# Patient Record
Sex: MALE | Race: WHITE | NOT HISPANIC OR LATINO
[De-identification: names, ages, dates, MRNs, and addresses within clinical notes are randomized per-mention and may not be internally consistent; named-entity substitution may affect disease eponyms.]

---
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
## 2020-09-14 ENCOUNTER — APPOINTMENT (OUTPATIENT)
Dept: GASTROENTEROLOGY | Facility: CLINIC | Age: 20
End: 2020-09-14
Payer: COMMERCIAL

## 2020-09-14 VITALS
DIASTOLIC BLOOD PRESSURE: 60 MMHG | BODY MASS INDEX: 31.5 KG/M2 | HEIGHT: 70 IN | SYSTOLIC BLOOD PRESSURE: 100 MMHG | TEMPERATURE: 97.6 F | WEIGHT: 220 LBS

## 2020-09-14 VITALS — TEMPERATURE: 97.5 F

## 2020-09-14 DIAGNOSIS — R94.5 ABNORMAL RESULTS OF LIVER FUNCTION STUDIES: ICD-10-CM

## 2020-09-14 PROCEDURE — 99214 OFFICE O/P EST MOD 30 MIN: CPT | Mod: 25

## 2020-09-14 PROCEDURE — 36415 COLL VENOUS BLD VENIPUNCTURE: CPT

## 2020-09-14 NOTE — PHYSICAL EXAM
[General Appearance - Alert] : alert [General Appearance - In No Acute Distress] : in no acute distress [General Appearance - Well Nourished] : well nourished [General Appearance - Well Developed] : well developed [FreeTextEntry1] : obese [Sclera] : the sclera and conjunctiva were normal [Hearing Threshold Finger Rub Not [place]] : hearing was normal [Heart Sounds] : normal S1 and S2 [Heart Rate And Rhythm] : heart rate was normal and rhythm regular [No CVA Tenderness] : no ~M costovertebral angle tenderness [Abnormal Walk] : normal gait [] : no rash [Skin Lesions] : no skin lesions [No Focal Deficits] : no focal deficits [Oriented To Time, Place, And Person] : oriented to person, place, and time

## 2020-09-14 NOTE — ASSESSMENT
[FreeTextEntry1] : chronic elevated lfts r/o fatty liver noted for one year, h/o increase cholesterol\par \par plan abdominal us\par         labs ordered\par        diet and exercise for weight loss.

## 2020-09-14 NOTE — REVIEW OF SYSTEMS
[As Noted in HPI] : as noted in HPI [Fever] : no fever [Chills] : no chills [Eyesight Problems] : no eyesight problems [Earache] : no earache [Loss Of Hearing] : no hearing loss [Chest Pain] : no chest pain [Palpitations] : no palpitations [Cough] : no cough [SOB on Exertion] : no shortness of breath during exertion [Hesitancy] : no urinary hesitancy [Joint Swelling] : no joint swelling [Anxiety] : no anxiety [Muscle Weakness] : no muscle weakness [Easy Bleeding] : no tendency for easy bleeding [Easy Bruising] : no tendency for easy bruising

## 2020-09-14 NOTE — HISTORY OF PRESENT ILLNESS
[FreeTextEntry1] : 19 yo male here for  follow up of abnormal lfts, alt elevated to 65  for over one year.  patient gained weight over quarantine. Patient saw nutritionist. no abdominal pain.normal bms, no brbpr, no melena..patient reports lost 5lbs.

## 2020-09-21 LAB
A1AT SERPL-MCNC: 116 MG/DL
ALBUMIN MFR SERPL ELPH: 62.1 %
ALBUMIN SERPL-MCNC: 4.8 G/DL
ALBUMIN/GLOB SERPL: 1.7 RATIO
ALPHA1 GLOB MFR SERPL ELPH: 3.2 %
ALPHA1 GLOB SERPL ELPH-MCNC: 0.2 G/DL
ALPHA2 GLOB MFR SERPL ELPH: 6.7 %
ALPHA2 GLOB SERPL ELPH-MCNC: 0.5 G/DL
ANA SER IF-ACNC: NEGATIVE
B-GLOBULIN MFR SERPL ELPH: 10.1 %
B-GLOBULIN SERPL ELPH-MCNC: 0.8 G/DL
CERULOPLASMIN SERPL-MCNC: 18 MG/DL
FERRITIN SERPL-MCNC: 331 NG/ML
GAMMA GLOB FLD ELPH-MCNC: 1.4 G/DL
GAMMA GLOB MFR SERPL ELPH: 17.9 %
HAV IGM SER QL: NONREACTIVE
HBV CORE IGM SER QL: NONREACTIVE
HBV SURFACE AB SER QL: NONREACTIVE
HBV SURFACE AG SER QL: NONREACTIVE
HCV AB SER QL: NONREACTIVE
HCV S/CO RATIO: 0.07 S/CO
HEPATITIS A IGG ANTIBODY: REACTIVE
INTERPRETATION SERPL IEP-IMP: NORMAL
IRON SATN MFR SERPL: 41 %
IRON SERPL-MCNC: 137 UG/DL
MITOCHONDRIA AB SER IF-ACNC: NORMAL
PROT SERPL-MCNC: 7.7 G/DL
PROT SERPL-MCNC: 7.7 G/DL
SMOOTH MUSCLE AB SER QL IF: ABNORMAL
TIBC SERPL-MCNC: 332 UG/DL
UIBC SERPL-MCNC: 196 UG/DL

## 2020-09-25 ENCOUNTER — OUTPATIENT (OUTPATIENT)
Dept: OUTPATIENT SERVICES | Facility: HOSPITAL | Age: 20
LOS: 1 days | End: 2020-09-25
Payer: COMMERCIAL

## 2020-09-25 ENCOUNTER — APPOINTMENT (OUTPATIENT)
Dept: ULTRASOUND IMAGING | Facility: CLINIC | Age: 20
End: 2020-09-25
Payer: COMMERCIAL

## 2020-09-25 DIAGNOSIS — Z00.8 ENCOUNTER FOR OTHER GENERAL EXAMINATION: ICD-10-CM

## 2020-09-25 PROCEDURE — 76700 US EXAM ABDOM COMPLETE: CPT | Mod: 26

## 2020-09-25 PROCEDURE — 76700 US EXAM ABDOM COMPLETE: CPT

## 2020-12-15 PROBLEM — J02.0 STREP THROAT: Status: RESOLVED | Noted: 2017-11-07 | Resolved: 2020-12-15

## 2020-12-16 PROBLEM — Z87.09 HISTORY OF ACUTE PHARYNGITIS: Status: RESOLVED | Noted: 2018-10-06 | Resolved: 2020-12-16

## 2021-03-10 ENCOUNTER — NON-APPOINTMENT (OUTPATIENT)
Age: 21
End: 2021-03-10

## 2021-03-10 ENCOUNTER — APPOINTMENT (OUTPATIENT)
Dept: ORTHOPEDIC SURGERY | Facility: CLINIC | Age: 21
End: 2021-03-10
Payer: COMMERCIAL

## 2021-03-10 VITALS — HEIGHT: 69 IN | BODY MASS INDEX: 27.7 KG/M2 | WEIGHT: 187 LBS

## 2021-03-10 DIAGNOSIS — M75.82 OTHER SHOULDER LESIONS, LEFT SHOULDER: ICD-10-CM

## 2021-03-10 PROCEDURE — 99203 OFFICE O/P NEW LOW 30 MIN: CPT

## 2021-03-10 PROCEDURE — 99072 ADDL SUPL MATRL&STAF TM PHE: CPT

## 2021-03-15 NOTE — HISTORY OF PRESENT ILLNESS
[de-identified] : PATIENT COMPLAINS OF LEFT SHOULDER\par PAIN BEGAN 3/3/2021\par PAIN    6/10 ,  RADIATES  UP NECK\par ASSOCIATED NUMBNESS\par CONSTANT \par SHARP, SHOOTING, THROBBING PAIN \par WORSE WITH OVERHEAD LIFTING, LAYING IN BED \par BETTER WITH TYLENOL, RESTING\par  \par \par \par \par

## 2021-03-15 NOTE — DISCUSSION/SUMMARY
[de-identified] : DICLOFENAC 2 X DAY 2-3 WEEKS\par HOME EXERCISES DAILY\par PT 2 X 4 WEEKS \par \par CONSIDER KENALOG INJECTION \par \par MRI IF NO RELIEF AFTER 12 WEEK OF TREATMENT\par

## 2021-03-15 NOTE — PHYSICAL EXAM
[de-identified] : PHYSICAL EXAM LEFT  SHOULDER\par \par MODERATE  SCAPULAR PROTRACTION\par AROM 140 / 140 / 90 / 30\par TENDER: SA REGION LATERAL \par \par SPECIAL TESTING :\par ROSE - POSITIVE \par LONA - POSITIVE \par SPEED TEST - POSITIVE\par \par VILLALBOOS - NEGATIVE \par APPREHENSION AND SUPPRESSION - NEGATIVE \par \par RC STRENGTH TESTING \par SS:  5/5\par SUB 5/5\par IS     5/5\par BICEPS  5/5\par \par SENSATION  - GROSSLY INTACT\par \par \par  [de-identified] : LEFT SHOULDER XRAY (2 VIEWS - AP AND OUTLET) -  \par NO OBVIOUS FRACTURE , SEPARATION OR DISLOCATION \par NO SIGNIFICANT OSTEOARTHRITIS, \par TYPE 1B ACROMION \par CSA= 40.4\par

## 2021-08-23 ENCOUNTER — TRANSCRIPTION ENCOUNTER (OUTPATIENT)
Age: 21
End: 2021-08-23

## 2022-03-24 ENCOUNTER — APPOINTMENT (OUTPATIENT)
Dept: ORTHOPEDIC SURGERY | Facility: CLINIC | Age: 22
End: 2022-03-24
Payer: COMMERCIAL

## 2022-03-24 DIAGNOSIS — M75.81 OTHER SHOULDER LESIONS, RIGHT SHOULDER: ICD-10-CM

## 2022-03-24 DIAGNOSIS — M75.42 IMPINGEMENT SYNDROME OF LEFT SHOULDER: ICD-10-CM

## 2022-03-24 DIAGNOSIS — M25.512 PAIN IN LEFT SHOULDER: ICD-10-CM

## 2022-03-24 PROCEDURE — 73030 X-RAY EXAM OF SHOULDER: CPT | Mod: RT

## 2022-03-24 PROCEDURE — 20611 DRAIN/INJ JOINT/BURSA W/US: CPT | Mod: 59,LT

## 2022-03-24 PROCEDURE — 99214 OFFICE O/P EST MOD 30 MIN: CPT | Mod: 25

## 2022-03-24 PROCEDURE — 76882 US LMTD JT/FCL EVL NVASC XTR: CPT | Mod: LT

## 2022-03-24 RX ORDER — DICLOFENAC SODIUM 75 MG/1
75 TABLET, DELAYED RELEASE ORAL TWICE DAILY
Qty: 60 | Refills: 2 | Status: ACTIVE | COMMUNITY
Start: 2021-03-10 | End: 1900-01-01

## 2022-03-24 NOTE — DISCUSSION/SUMMARY
[de-identified] : ULTRASOUND EVALUATION  REVEALS INFLAMMATORY CHANGES WITHOUT SIGNIFICANT TEAR \par PATIENT HAS ELECTED TO PROCEED WITH KENALOG INJECTION SHOULDER \par RISKS AND BENEFITS DISCUSSED - VERBAL CONSENT OBTAINED \par SEE PROCEDURE NOTE\par \par \par POST INJECTION INSTRUCTIONS:\par \par INJECTION THERAPY HANDOUT PROVIDED\par \par COLD THERAPY , ANALGESICS PRN\par \par HOME  EXERCISES QD - PENDULUM AND ROM  HANDOUT PROVIDED, REVIEWED AND DEMONSTRATED - REFERRED TO INSTRUCTIONAL VIDEO ON MY WEBSITE\par \par START P.T.  WITHIN 2 WEEKS AFTER INJECTION - 2 X 4 WEEKS \par \par MRI IF NO RELIEF 12 WEEKS\par

## 2022-03-24 NOTE — PROCEDURE
[de-identified] : DIAGNOSTIC ULTRASOUND LEFT SHOULDER\par \par DIAGNOSTIC SONOGRAPHY of the Rotator Cuff Soft Tissue of the LEFT  SHOULDER was performed in Multiple Scan Planes with varying transducer frequencies.\par Imaging of the Supraspinatus Tendon reveals TENDONITIS, BURSITIS,  WITH OUT SIGNIFICANT / COMPLETE TEAR\par Imaging of the Biceps Tendon reveals no significant tear.\par Imaging of the Subscapularis Tendon reveals no significant tear.\par Imaging of the Infraspinatus Tendon reveals no significant tear.\par Key images were save digitally and reviewed with patient.\par \par \par INJECTION LEFT SHOULDER SA SPACE\par \par Patient has demonstrated limited relief from NSAIDS, rest, exercises / PT, and after discussion of the risks and benefits, the patient has elected to proceed with an ULTRASOUND GUIDED injection into the LEFT SUBACROMIAL  SPACE LATERAL APPROACH \par  \par Confirmed that the patient does not have history of prior adverse reactions, active, infections, or relevant allergies. There was no effusion, erythema, or warmth, and the skin was clear\par \par The skin was sterilized with alcohol. Ethyl Chloride was used as a topical anesthetic. Routine sterile technique. \par The site was injected UTILIZING ULTRASOUND GUIDANCE to confirm appropriate placement of the needle-\par with a mixture of medication and local anesthetic. The injection was completed without complication and a bandage was applied.\par  \par The patient tolerated the procedure well and was given post-injection instructions.Rec: Cold therapy, analgesics, avoid heavy activity.\par MEDICATION: 4cc of 1% xylocaine + 10mg of KENALOG\par \par

## 2022-03-24 NOTE — PHYSICAL EXAM
[de-identified] : PHYSICAL EXAM LEFT AND RIGHT   SHOULDER - RHD\par \par MILD PROTRACTION\par AROM \par LEFT  140 / 140 / 95 / 15\par RIGHT   140 / 140 / 95 / 30\par TENDER: SA REGION LATERAL \par \par SPECIAL TESTING :\par ROSE - POSITIVE \par LONA - POSITIVE \par SPEED TEST - POSITIVE\par \par VILLALOBOS - NEGATIVE \par APPREHENSION AND SUPPRESSION - NEGATIVE \par \par RC STRENGTH TESTING \par SS:  5/5\par SUB 5/5\par IS     5/5\par BICEPS  5/5\par \par SENSATION  - GROSSLY INTACT\par \par \par  [de-identified] : RIGHT SHOULDER XRAY (2 VIEWS - AP AND OUTLET) -  \par NO OBVIOUS FRACTURE ,  SEPARATION OR DISLOCATION \par NO SIGNIFICANT OSTEOARTHRITIS,\par TYPE 2B ACROMION \par CSA=33.8\par

## 2022-03-24 NOTE — HISTORY OF PRESENT ILLNESS
[de-identified] : PATIENT COMPLAINS OF BILATERAL SHOULDER PAIN  - RHD\par LEFT WORSE THAN RIGHT \par LEFT SHOULDER-CONSTANT PAIN \par PAIN FLARED UP  MARCH 2022 \par PAIN    6/10 \par MARCH 2021- PATIENT TRIED DICLOFENAC PILL AND AT HOME EXERCISES-  RELIEF\par CONSTANT \par SHARP, SHOOTING, THROBBING PAIN \par WORSE WITH OVERHEAD LIFTING, LAYING IN BED \par BETTER WITH TYLENOL, RESTING\par  \par \par \par \par

## 2022-03-25 PROBLEM — M75.42 IMPINGEMENT SYNDROME OF LEFT SHOULDER: Status: ACTIVE | Noted: 2021-03-10

## 2022-03-25 PROBLEM — M25.512 LEFT SHOULDER PAIN: Status: ACTIVE | Noted: 2021-03-10

## 2022-07-06 ENCOUNTER — NON-APPOINTMENT (OUTPATIENT)
Age: 22
End: 2022-07-06

## 2024-08-21 NOTE — REASON FOR VISIT
[Parent] : parent [FreeTextEntry1] : left sided abdominal pain BMI: BMI (kg/m2): 33.2 (07-24-24 @ 18:09)  HbA1c:   Glucose:   BP: --Vital Signs Last 24 Hrs  T(C): 36.4 (08-21-24 @ 08:06), Max: 36.4 (08-20-24 @ 21:02)  T(F): 97.6 (08-21-24 @ 08:06), Max: 97.6 (08-21-24 @ 08:06)  HR: --  BP: --  BP(mean): --  RR: 16 (08-21-24 @ 08:06) (16 - 16)  SpO2: --    Orthostatic VS  08-21-24 @ 08:06  Lying BP: --/-- HR: --  Sitting BP: 106/68 HR: 98  Standing BP: 106/57 HR: 114  Site: --  Mode: --  Orthostatic VS  08-20-24 @ 08:16  Lying BP: --/-- HR: --  Sitting BP: 108/75 HR: 80  Standing BP: 113/80 HR: 100  Site: --  Mode: --    Lipid Panel:  BMI: BMI (kg/m2): 33.2 (07-24-24 @ 18:09)  HbA1c:   Glucose:   BP: --Vital Signs Last 24 Hrs  T(C): 36.1 (08-22-24 @ 08:08), Max: 36.4 (08-21-24 @ 20:26)  T(F): 96.9 (08-22-24 @ 08:08), Max: 97.5 (08-21-24 @ 20:26)  HR: --  BP: --  BP(mean): --  RR: --  SpO2: --    Orthostatic VS  08-22-24 @ 08:08  Lying BP: --/-- HR: --  Sitting BP: 117/68 HR: 94  Standing BP: 115/61 HR: 111  Site: --  Mode: --  Orthostatic VS  08-21-24 @ 08:06  Lying BP: --/-- HR: --  Sitting BP: 106/68 HR: 98  Standing BP: 106/57 HR: 114  Site: --  Mode: --  Orthostatic VS  08-20-24 @ 08:16  Lying BP: --/-- HR: --  Sitting BP: 108/75 HR: 80  Standing BP: 113/80 HR: 100  Site: --  Mode: --    Lipid Panel:  BMI: BMI (kg/m2): 33.2 (07-24-24 @ 18:09)  HbA1c:   Glucose:   BP: --Vital Signs Last 24 Hrs  T(C): 36.1 (08-22-24 @ 08:08), Max: 36.4 (08-21-24 @ 20:26)  T(F): 96.9 (08-22-24 @ 08:08), Max: 97.5 (08-21-24 @ 20:26)  HR: --  BP: --  BP(mean): --  RR: --  SpO2: --    Orthostatic VS  08-22-24 @ 08:08  Lying BP: --/-- HR: --  Sitting BP: 117/68 HR: 94  Standing BP: 115/61 HR: 111  Site: --  Mode: --  Orthostatic VS  08-21-24 @ 08:06  Lying BP: --/-- HR: --  Sitting BP: 106/68 HR: 98  Standing BP: 106/57 HR: 114  Site: --  Mode: --    Lipid Panel: